# Patient Record
Sex: MALE | Race: WHITE | NOT HISPANIC OR LATINO | URBAN - METROPOLITAN AREA
[De-identification: names, ages, dates, MRNs, and addresses within clinical notes are randomized per-mention and may not be internally consistent; named-entity substitution may affect disease eponyms.]

---

## 2017-01-18 ENCOUNTER — OUTPATIENT (OUTPATIENT)
Dept: OUTPATIENT SERVICES | Facility: HOSPITAL | Age: 57
LOS: 1 days | End: 2017-01-18
Payer: COMMERCIAL

## 2017-01-18 VITALS
TEMPERATURE: 98 F | RESPIRATION RATE: 18 BRPM | OXYGEN SATURATION: 95 % | HEART RATE: 87 BPM | HEIGHT: 78 IN | WEIGHT: 220.02 LBS | DIASTOLIC BLOOD PRESSURE: 82 MMHG | SYSTOLIC BLOOD PRESSURE: 130 MMHG

## 2017-01-18 DIAGNOSIS — T14.8 OTHER INJURY OF UNSPECIFIED BODY REGION: ICD-10-CM

## 2017-01-18 DIAGNOSIS — Z90.89 ACQUIRED ABSENCE OF OTHER ORGANS: Chronic | ICD-10-CM

## 2017-01-18 DIAGNOSIS — S42.351A DISPLACED COMMINUTED FRACTURE OF SHAFT OF HUMERUS, RIGHT ARM, INITIAL ENCOUNTER FOR CLOSED FRACTURE: ICD-10-CM

## 2017-01-18 DIAGNOSIS — Z98.890 OTHER SPECIFIED POSTPROCEDURAL STATES: Chronic | ICD-10-CM

## 2017-01-18 DIAGNOSIS — Z01.818 ENCOUNTER FOR OTHER PREPROCEDURAL EXAMINATION: ICD-10-CM

## 2017-01-18 LAB
BLD GP AB SCN SERPL QL: SIGNIFICANT CHANGE UP
HCT VFR BLD CALC: 44.9 % — SIGNIFICANT CHANGE UP (ref 39–50)
HGB BLD-MCNC: 15.8 G/DL — SIGNIFICANT CHANGE UP (ref 13–17)
MCHC RBC-ENTMCNC: 31.7 PG — SIGNIFICANT CHANGE UP (ref 27–34)
MCHC RBC-ENTMCNC: 35.2 GM/DL — SIGNIFICANT CHANGE UP (ref 32–36)
MCV RBC AUTO: 89.9 FL — SIGNIFICANT CHANGE UP (ref 80–100)
PLATELET # BLD AUTO: 299 K/UL — SIGNIFICANT CHANGE UP (ref 150–400)
RBC # BLD: 5 M/UL — SIGNIFICANT CHANGE UP (ref 4.2–5.8)
RBC # FLD: 11.5 % — SIGNIFICANT CHANGE UP (ref 10.3–14.5)
WBC # BLD: 7.3 K/UL — SIGNIFICANT CHANGE UP (ref 3.8–10.5)
WBC # FLD AUTO: 7.3 K/UL — SIGNIFICANT CHANGE UP (ref 3.8–10.5)

## 2017-01-18 PROCEDURE — 86900 BLOOD TYPING SEROLOGIC ABO: CPT

## 2017-01-18 PROCEDURE — 36415 COLL VENOUS BLD VENIPUNCTURE: CPT

## 2017-01-18 PROCEDURE — 86850 RBC ANTIBODY SCREEN: CPT

## 2017-01-18 PROCEDURE — G0463: CPT

## 2017-01-18 PROCEDURE — 85027 COMPLETE CBC AUTOMATED: CPT

## 2017-01-18 PROCEDURE — 86901 BLOOD TYPING SEROLOGIC RH(D): CPT

## 2017-01-18 NOTE — H&P PST ADULT - NSANTHOSAYNRD_GEN_A_CORE
No. CHEL screening performed.  STOP BANG Legend: 0-2 = LOW Risk; 3-4 = INTERMEDIATE Risk; 5-8 = HIGH Risk

## 2017-01-18 NOTE — H&P PST ADULT - PSH
H/O hernia repair  bilateral inguinal hernia  History of eye surgery  retinal detachment  S/P tonsillectomy

## 2017-01-18 NOTE — H&P PST ADULT - HISTORY OF PRESENT ILLNESS
57 y/o male with h/o fall in house from stairs on 12/26/16 fracturing his right humerus , scheduled for surgery

## 2017-01-19 RX ORDER — SODIUM CHLORIDE 9 MG/ML
1000 INJECTION, SOLUTION INTRAVENOUS
Qty: 0 | Refills: 0 | Status: DISCONTINUED | OUTPATIENT
Start: 2017-01-20 | End: 2017-01-20

## 2017-01-19 NOTE — PATIENT PROFILE ADULT. - VISION (WITH CORRECTIVE LENSES IF THE PATIENT USUALLY WEARS THEM):
Normal vision: sees adequately in most situations; can see medication labels, newsprint Severely impaired: cannot locate objects without hearing or touching them or patient nonresponsive.

## 2017-01-20 ENCOUNTER — INPATIENT (INPATIENT)
Facility: HOSPITAL | Age: 57
LOS: 0 days | Discharge: HOME | DRG: 494 | End: 2017-01-21
Attending: SPECIALIST | Admitting: SPECIALIST
Payer: COMMERCIAL

## 2017-01-20 VITALS
HEIGHT: 78 IN | SYSTOLIC BLOOD PRESSURE: 132 MMHG | DIASTOLIC BLOOD PRESSURE: 87 MMHG | WEIGHT: 216.49 LBS | HEART RATE: 89 BPM | TEMPERATURE: 99 F | OXYGEN SATURATION: 98 % | RESPIRATION RATE: 14 BRPM

## 2017-01-20 DIAGNOSIS — Z98.890 OTHER SPECIFIED POSTPROCEDURAL STATES: Chronic | ICD-10-CM

## 2017-01-20 DIAGNOSIS — S42.351A DISPLACED COMMINUTED FRACTURE OF SHAFT OF HUMERUS, RIGHT ARM, INITIAL ENCOUNTER FOR CLOSED FRACTURE: ICD-10-CM

## 2017-01-20 DIAGNOSIS — Z90.89 ACQUIRED ABSENCE OF OTHER ORGANS: Chronic | ICD-10-CM

## 2017-01-20 PROCEDURE — 99223 1ST HOSP IP/OBS HIGH 75: CPT

## 2017-01-20 RX ORDER — SODIUM CHLORIDE 9 MG/ML
1000 INJECTION, SOLUTION INTRAVENOUS
Qty: 0 | Refills: 0 | Status: DISCONTINUED | OUTPATIENT
Start: 2017-01-20 | End: 2017-01-20

## 2017-01-20 RX ORDER — ASPIRIN/CALCIUM CARB/MAGNESIUM 324 MG
325 TABLET ORAL EVERY 12 HOURS
Qty: 0 | Refills: 0 | Status: DISCONTINUED | OUTPATIENT
Start: 2017-01-21 | End: 2017-01-21

## 2017-01-20 RX ORDER — HYDROMORPHONE HYDROCHLORIDE 2 MG/ML
0.5 INJECTION INTRAMUSCULAR; INTRAVENOUS; SUBCUTANEOUS EVERY 6 HOURS
Qty: 0 | Refills: 0 | Status: DISCONTINUED | OUTPATIENT
Start: 2017-01-20 | End: 2017-01-21

## 2017-01-20 RX ORDER — SODIUM CHLORIDE 9 MG/ML
1000 INJECTION, SOLUTION INTRAVENOUS
Qty: 0 | Refills: 0 | Status: DISCONTINUED | OUTPATIENT
Start: 2017-01-20 | End: 2017-01-21

## 2017-01-20 RX ORDER — CEFAZOLIN SODIUM 1 G
2000 VIAL (EA) INJECTION ONCE
Qty: 0 | Refills: 0 | Status: COMPLETED | OUTPATIENT
Start: 2017-01-20 | End: 2017-01-20

## 2017-01-20 RX ORDER — ESCITALOPRAM OXALATE 10 MG/1
20 TABLET, FILM COATED ORAL DAILY
Qty: 0 | Refills: 0 | Status: DISCONTINUED | OUTPATIENT
Start: 2017-01-20 | End: 2017-01-21

## 2017-01-20 RX ORDER — CEFAZOLIN SODIUM 1 G
2000 VIAL (EA) INJECTION EVERY 8 HOURS
Qty: 0 | Refills: 0 | Status: COMPLETED | OUTPATIENT
Start: 2017-01-21 | End: 2017-01-21

## 2017-01-20 RX ORDER — OXYCODONE HYDROCHLORIDE 5 MG/1
10 TABLET ORAL EVERY 4 HOURS
Qty: 0 | Refills: 0 | Status: DISCONTINUED | OUTPATIENT
Start: 2017-01-20 | End: 2017-01-21

## 2017-01-20 RX ORDER — OXYCODONE HYDROCHLORIDE 5 MG/1
1 TABLET ORAL
Qty: 15 | Refills: 0 | OUTPATIENT
Start: 2017-01-20

## 2017-01-20 RX ORDER — INFLUENZA VIRUS VACCINE 15; 15; 15; 15 UG/.5ML; UG/.5ML; UG/.5ML; UG/.5ML
0.5 SUSPENSION INTRAMUSCULAR ONCE
Qty: 0 | Refills: 0 | Status: COMPLETED | OUTPATIENT
Start: 2017-01-20 | End: 2017-01-21

## 2017-01-20 RX ORDER — OXYCODONE HYDROCHLORIDE 5 MG/1
5 TABLET ORAL EVERY 4 HOURS
Qty: 0 | Refills: 0 | Status: DISCONTINUED | OUTPATIENT
Start: 2017-01-20 | End: 2017-01-21

## 2017-01-20 RX ORDER — OXYCODONE HYDROCHLORIDE 5 MG/1
5 TABLET ORAL ONCE
Qty: 0 | Refills: 0 | Status: DISCONTINUED | OUTPATIENT
Start: 2017-01-20 | End: 2017-01-20

## 2017-01-20 RX ORDER — ASPIRIN/CALCIUM CARB/MAGNESIUM 324 MG
1 TABLET ORAL
Qty: 84 | Refills: 0 | OUTPATIENT
Start: 2017-01-20 | End: 2017-03-03

## 2017-01-20 RX ORDER — ONDANSETRON 8 MG/1
4 TABLET, FILM COATED ORAL EVERY 6 HOURS
Qty: 0 | Refills: 0 | Status: DISCONTINUED | OUTPATIENT
Start: 2017-01-20 | End: 2017-01-21

## 2017-01-20 RX ORDER — LAMOTRIGINE 25 MG/1
200 TABLET, ORALLY DISINTEGRATING ORAL
Qty: 0 | Refills: 0 | Status: DISCONTINUED | OUTPATIENT
Start: 2017-01-20 | End: 2017-01-21

## 2017-01-20 RX ORDER — HYDROMORPHONE HYDROCHLORIDE 2 MG/ML
0.5 INJECTION INTRAMUSCULAR; INTRAVENOUS; SUBCUTANEOUS
Qty: 0 | Refills: 0 | Status: DISCONTINUED | OUTPATIENT
Start: 2017-01-20 | End: 2017-01-20

## 2017-01-20 RX ORDER — ONDANSETRON 8 MG/1
4 TABLET, FILM COATED ORAL ONCE
Qty: 0 | Refills: 0 | Status: DISCONTINUED | OUTPATIENT
Start: 2017-01-20 | End: 2017-01-20

## 2017-01-20 RX ADMIN — OXYCODONE HYDROCHLORIDE 5 MILLIGRAM(S): 5 TABLET ORAL at 21:33

## 2017-01-20 RX ADMIN — OXYCODONE HYDROCHLORIDE 5 MILLIGRAM(S): 5 TABLET ORAL at 22:36

## 2017-01-20 RX ADMIN — OXYCODONE HYDROCHLORIDE 5 MILLIGRAM(S): 5 TABLET ORAL at 22:03

## 2017-01-20 RX ADMIN — OXYCODONE HYDROCHLORIDE 5 MILLIGRAM(S): 5 TABLET ORAL at 23:06

## 2017-01-20 RX ADMIN — SODIUM CHLORIDE 75 MILLILITER(S): 9 INJECTION, SOLUTION INTRAVENOUS at 11:13

## 2017-01-20 NOTE — DISCHARGE NOTE ADULT - PATIENT PORTAL LINK FT
“You can access the Follow[employer] Patient Portal, offered by [facility], by registering with the following website: [email or web address]”

## 2017-01-20 NOTE — DISCHARGE NOTE ADULT - HOSPITAL COURSE
This patient was admitted to [facility] with a displaced fracture of the right humeral shaft.  Patient went to Pre-Surgical Testing at [facility] and was medically cleared to undergo elective procedure. Patient underwent ORIF right humerus by Dr. Kris Miles on 1/20/17. Procedure was well tolerated.  No operative or gregor-operative complications arose during patients hospital course.  Patient received antibiotic according to SCIP guidelines for infection prevention. Ecotrin was given for DVT prophylaxis.  Anesthesia, Medical Hospitalist, Physical Therapy and Occupational Therapy were consulted. Patient is stable for discharge with a good prognosis.  Appropriate discharge instructions and medications are provided in this document. This patient was admitted to [facility] with a displaced fracture of the right humeral shaft.  Patient went to Pre-Surgical Testing at [facility] and was medically cleared to undergo elective procedure. Patient underwent ORIF right humerus by Dr. Kris Miles on 1/20/17. Procedure was well tolerated.  Uncomplicated postoperative course followed.    Patient received antibiotic according to SCIP guidelines for infection prevention. Ecotrin was given for DVT prophylaxis.  Anesthesia, Medical Hospitalist, Physical Therapy and Occupational Therapy were consulted. Patient is stable for discharge with a good prognosis.  Appropriate discharge instructions and medications are provided in this document.

## 2017-01-20 NOTE — DISCHARGE NOTE ADULT - PLAN OF CARE
improve pain and increase function of Right upper extremity Continue splint, non-weightbearing right upper extremity Continue splint, non-weightbearing right upper extremity, Flex and extend the wrist and hand often to reduce swelling and stiffness,  Apply ice packs to the arm to reduce pain and swelling.  Keep the splint/ dressing clean and dry.

## 2017-01-20 NOTE — DISCHARGE NOTE ADULT - CARE PROVIDER_API CALL
Kris Miles), Orthopaedic Surgery  [address] [place] [address]  Phone: [phone]  Fax: [phone]

## 2017-01-20 NOTE — DISCHARGE NOTE ADULT - MEDICATION SUMMARY - MEDICATIONS TO TAKE
I will START or STAY ON the medications listed below when I get home from the hospital:    acetaminophen-oxyCODONE 325 mg-5 mg oral tablet  -- 1 tab(s) by mouth every 6 hours MDD:6 tabs - for moderate pain  -- Caution federal law prohibits the transfer of this drug to any person other  than the person for whom it was prescribed.  May cause drowsiness.  Alcohol may intensify this effect.  Use care when operating dangerous machinery.  This prescription cannot be refilled.  This product contains acetaminophen.  Do not use  with any other product containing acetaminophen to prevent possible liver damage.  Using more of this medication than prescribed may cause serious breathing problems.    -- Indication: For pain    Aspirin Enteric Coated 325 mg oral delayed release tablet  -- 1 tab(s) by mouth every 12 hours MDD:2  -- Swallow whole.  Do not crush.  Take with food or milk.    -- Indication: For prevent DVT clots    aspirin 325 mg oral delayed release tablet  -- 1 tab(s) by mouth every 12 hours  -- Indication: For same as above    LaMICtal  -- 200 milligram(s) by mouth 2 times a day  -- Indication: For seizure control, mood    Lexapro  -- 20  by mouth once a day  -- Indication: For mood

## 2017-01-20 NOTE — DISCHARGE NOTE ADULT - MEDICATION SUMMARY - MEDICATIONS TO STOP TAKING
I will STOP taking the medications listed below when I get home from the hospital:    Advil  --  by mouth , As Needed  -- 800mg

## 2017-01-20 NOTE — PHYSICAL THERAPY INITIAL EVALUATION ADULT - RANGE OF MOTION EXAMINATION, REHAB EVAL
Left UE ROM was WFL (within functional limits)/R shoulder NT due to sx/bilateral lower extremity ROM was WFL (within functional limits)

## 2017-01-20 NOTE — DISCHARGE NOTE ADULT - NS AS ACTIVITY OBS
Do not drive or operate machinery/No Heavy lifting/straining No Heavy lifting/straining/Do not drive or operate machinery/Showering allowed

## 2017-01-20 NOTE — DISCHARGE NOTE ADULT - CARE PLAN
Principal Discharge DX:	Closed displaced transverse fracture of shaft of right humerus, initial encounter  Goal:	improve pain and increase function of Right upper extremity  Instructions for follow-up, activity and diet:	Continue splint, non-weightbearing right upper extremity Principal Discharge DX:	Closed displaced transverse fracture of shaft of right humerus, initial encounter  Goal:	improve pain and increase function of Right upper extremity  Instructions for follow-up, activity and diet:	Continue splint, non-weightbearing right upper extremity, Flex and extend the wrist and hand often to reduce swelling and stiffness,  Apply ice packs to the arm to reduce pain and swelling.  Keep the splint/ dressing clean and dry.

## 2017-01-21 VITALS
DIASTOLIC BLOOD PRESSURE: 71 MMHG | SYSTOLIC BLOOD PRESSURE: 118 MMHG | OXYGEN SATURATION: 94 % | RESPIRATION RATE: 18 BRPM | HEART RATE: 17 BPM | TEMPERATURE: 98 F

## 2017-01-21 LAB
ANION GAP SERPL CALC-SCNC: 6 MMOL/L — SIGNIFICANT CHANGE UP (ref 5–17)
BASOPHILS # BLD AUTO: 0.1 K/UL — SIGNIFICANT CHANGE UP (ref 0–0.2)
BASOPHILS NFR BLD AUTO: 1 % — SIGNIFICANT CHANGE UP (ref 0–2)
BUN SERPL-MCNC: 19 MG/DL — SIGNIFICANT CHANGE UP (ref 7–23)
CALCIUM SERPL-MCNC: 9 MG/DL — SIGNIFICANT CHANGE UP (ref 8.4–10.5)
CHLORIDE SERPL-SCNC: 101 MMOL/L — SIGNIFICANT CHANGE UP (ref 96–108)
CO2 SERPL-SCNC: 28 MMOL/L — SIGNIFICANT CHANGE UP (ref 22–31)
CREAT SERPL-MCNC: 1.23 MG/DL — SIGNIFICANT CHANGE UP (ref 0.5–1.3)
EOSINOPHIL # BLD AUTO: 0.1 K/UL — SIGNIFICANT CHANGE UP (ref 0–0.5)
EOSINOPHIL NFR BLD AUTO: 0.6 % — SIGNIFICANT CHANGE UP (ref 0–6)
GLUCOSE SERPL-MCNC: 126 MG/DL — HIGH (ref 70–99)
HCT VFR BLD CALC: 34.4 % — LOW (ref 39–50)
HGB BLD-MCNC: 12.6 G/DL — LOW (ref 13–17)
LYMPHOCYTES # BLD AUTO: 2.3 K/UL — SIGNIFICANT CHANGE UP (ref 1–3.3)
LYMPHOCYTES # BLD AUTO: 21.2 % — SIGNIFICANT CHANGE UP (ref 13–44)
MCHC RBC-ENTMCNC: 32.6 PG — SIGNIFICANT CHANGE UP (ref 27–34)
MCHC RBC-ENTMCNC: 36.7 GM/DL — HIGH (ref 32–36)
MCV RBC AUTO: 88.7 FL — SIGNIFICANT CHANGE UP (ref 80–100)
MONOCYTES # BLD AUTO: 1 K/UL — HIGH (ref 0–0.9)
MONOCYTES NFR BLD AUTO: 9.2 % — SIGNIFICANT CHANGE UP (ref 2–14)
NEUTROPHILS # BLD AUTO: 7.5 K/UL — HIGH (ref 1.8–7.4)
NEUTROPHILS NFR BLD AUTO: 68 % — SIGNIFICANT CHANGE UP (ref 43–77)
PLATELET # BLD AUTO: 230 K/UL — SIGNIFICANT CHANGE UP (ref 150–400)
POTASSIUM SERPL-MCNC: 3.9 MMOL/L — SIGNIFICANT CHANGE UP (ref 3.5–5.3)
POTASSIUM SERPL-SCNC: 3.9 MMOL/L — SIGNIFICANT CHANGE UP (ref 3.5–5.3)
RBC # BLD: 3.87 M/UL — LOW (ref 4.2–5.8)
RBC # FLD: 11.5 % — SIGNIFICANT CHANGE UP (ref 10.3–14.5)
SODIUM SERPL-SCNC: 135 MMOL/L — SIGNIFICANT CHANGE UP (ref 135–145)
WBC # BLD: 11 K/UL — HIGH (ref 3.8–10.5)
WBC # FLD AUTO: 11 K/UL — HIGH (ref 3.8–10.5)

## 2017-01-21 PROCEDURE — 94664 DEMO&/EVAL PT USE INHALER: CPT

## 2017-01-21 PROCEDURE — 85027 COMPLETE CBC AUTOMATED: CPT

## 2017-01-21 PROCEDURE — 80048 BASIC METABOLIC PNL TOTAL CA: CPT

## 2017-01-21 PROCEDURE — 97530 THERAPEUTIC ACTIVITIES: CPT

## 2017-01-21 PROCEDURE — 76000 FLUOROSCOPY <1 HR PHYS/QHP: CPT

## 2017-01-21 PROCEDURE — C1713: CPT

## 2017-01-21 PROCEDURE — 97001: CPT

## 2017-01-21 PROCEDURE — 97161 PT EVAL LOW COMPLEX 20 MIN: CPT

## 2017-01-21 PROCEDURE — 97116 GAIT TRAINING THERAPY: CPT

## 2017-01-21 PROCEDURE — 99233 SBSQ HOSP IP/OBS HIGH 50: CPT

## 2017-01-21 PROCEDURE — 97165 OT EVAL LOW COMPLEX 30 MIN: CPT

## 2017-01-21 PROCEDURE — 97003: CPT

## 2017-01-21 PROCEDURE — 99239 HOSP IP/OBS DSCHRG MGMT >30: CPT

## 2017-01-21 PROCEDURE — 90686 IIV4 VACC NO PRSV 0.5 ML IM: CPT

## 2017-01-21 PROCEDURE — C1889: CPT

## 2017-01-21 RX ORDER — ASPIRIN/CALCIUM CARB/MAGNESIUM 324 MG
1 TABLET ORAL
Qty: 0 | Refills: 0 | COMMUNITY
Start: 2017-01-21

## 2017-01-21 RX ORDER — IBUPROFEN 200 MG
0 TABLET ORAL
Qty: 0 | Refills: 0 | COMMUNITY

## 2017-01-21 RX ADMIN — Medication 100 MILLIGRAM(S): at 00:10

## 2017-01-21 RX ADMIN — OXYCODONE HYDROCHLORIDE 10 MILLIGRAM(S): 5 TABLET ORAL at 14:30

## 2017-01-21 RX ADMIN — Medication 325 MILLIGRAM(S): at 09:15

## 2017-01-21 RX ADMIN — OXYCODONE HYDROCHLORIDE 10 MILLIGRAM(S): 5 TABLET ORAL at 10:02

## 2017-01-21 RX ADMIN — OXYCODONE HYDROCHLORIDE 10 MILLIGRAM(S): 5 TABLET ORAL at 13:48

## 2017-01-21 RX ADMIN — Medication 100 MILLIGRAM(S): at 07:58

## 2017-01-21 RX ADMIN — OXYCODONE HYDROCHLORIDE 10 MILLIGRAM(S): 5 TABLET ORAL at 10:35

## 2017-01-21 RX ADMIN — LAMOTRIGINE 200 MILLIGRAM(S): 25 TABLET, ORALLY DISINTEGRATING ORAL at 05:24

## 2017-01-21 RX ADMIN — INFLUENZA VIRUS VACCINE 0.5 MILLILITER(S): 15; 15; 15; 15 SUSPENSION INTRAMUSCULAR at 10:11

## 2017-01-21 RX ADMIN — OXYCODONE HYDROCHLORIDE 10 MILLIGRAM(S): 5 TABLET ORAL at 06:45

## 2017-01-21 RX ADMIN — OXYCODONE HYDROCHLORIDE 10 MILLIGRAM(S): 5 TABLET ORAL at 06:15

## 2017-04-25 PROBLEM — F31.70 BIPOLAR DISORDER, CURRENTLY IN REMISSION, MOST RECENT EPISODE UNSPECIFIED: Chronic | Status: ACTIVE | Noted: 2017-01-18

## 2017-04-26 ENCOUNTER — OUTPATIENT (OUTPATIENT)
Dept: OUTPATIENT SERVICES | Facility: HOSPITAL | Age: 57
LOS: 1 days | End: 2017-04-26
Payer: COMMERCIAL

## 2017-04-26 VITALS
WEIGHT: 214.95 LBS | HEART RATE: 78 BPM | RESPIRATION RATE: 16 BRPM | DIASTOLIC BLOOD PRESSURE: 76 MMHG | TEMPERATURE: 98 F | SYSTOLIC BLOOD PRESSURE: 118 MMHG | HEIGHT: 78 IN | OXYGEN SATURATION: 98 %

## 2017-04-26 DIAGNOSIS — Z01.818 ENCOUNTER FOR OTHER PREPROCEDURAL EXAMINATION: ICD-10-CM

## 2017-04-26 DIAGNOSIS — T14.8 OTHER INJURY OF UNSPECIFIED BODY REGION: ICD-10-CM

## 2017-04-26 DIAGNOSIS — Z96.7 PRESENCE OF OTHER BONE AND TENDON IMPLANTS: Chronic | ICD-10-CM

## 2017-04-26 DIAGNOSIS — S42.321A DISPLACED TRANSVERSE FRACTURE OF SHAFT OF HUMERUS, RIGHT ARM, INITIAL ENCOUNTER FOR CLOSED FRACTURE: ICD-10-CM

## 2017-04-26 DIAGNOSIS — Z98.890 OTHER SPECIFIED POSTPROCEDURAL STATES: Chronic | ICD-10-CM

## 2017-04-26 DIAGNOSIS — Z90.89 ACQUIRED ABSENCE OF OTHER ORGANS: Chronic | ICD-10-CM

## 2017-04-26 LAB
BLD GP AB SCN SERPL QL: SIGNIFICANT CHANGE UP
HCT VFR BLD CALC: 45.6 % — SIGNIFICANT CHANGE UP (ref 39–50)
HGB BLD-MCNC: 15.3 G/DL — SIGNIFICANT CHANGE UP (ref 13–17)
MCHC RBC-ENTMCNC: 30.5 PG — SIGNIFICANT CHANGE UP (ref 27–34)
MCHC RBC-ENTMCNC: 33.6 GM/DL — SIGNIFICANT CHANGE UP (ref 32–36)
MCV RBC AUTO: 90.7 FL — SIGNIFICANT CHANGE UP (ref 80–100)
PLATELET # BLD AUTO: 228 K/UL — SIGNIFICANT CHANGE UP (ref 150–400)
RBC # BLD: 5.03 M/UL — SIGNIFICANT CHANGE UP (ref 4.2–5.8)
RBC # FLD: 11.2 % — SIGNIFICANT CHANGE UP (ref 10.3–14.5)
WBC # BLD: 6.6 K/UL — SIGNIFICANT CHANGE UP (ref 3.8–10.5)
WBC # FLD AUTO: 6.6 K/UL — SIGNIFICANT CHANGE UP (ref 3.8–10.5)

## 2017-04-26 PROCEDURE — 85027 COMPLETE CBC AUTOMATED: CPT

## 2017-04-26 PROCEDURE — G0463: CPT

## 2017-04-26 PROCEDURE — 86900 BLOOD TYPING SEROLOGIC ABO: CPT

## 2017-04-26 PROCEDURE — 93010 ELECTROCARDIOGRAM REPORT: CPT | Mod: NC

## 2017-04-26 PROCEDURE — 86850 RBC ANTIBODY SCREEN: CPT

## 2017-04-26 PROCEDURE — 36415 COLL VENOUS BLD VENIPUNCTURE: CPT

## 2017-04-26 PROCEDURE — 93005 ELECTROCARDIOGRAM TRACING: CPT

## 2017-04-26 PROCEDURE — 86901 BLOOD TYPING SEROLOGIC RH(D): CPT

## 2017-04-26 NOTE — H&P PST ADULT - PSH
H/O hernia repair  bilateral inguinal hernia 1970  History of eye surgery  retinal detachment,1998  S/P ORIF (open reduction internal fixation) fracture  right humerus 1/20/17  S/P tonsillectomy

## 2017-04-26 NOTE — H&P PST ADULT - PMH
Bipolar disorder, currently in remission    Depression    Fracture  right humerus fracture, 1/2017, s/p ORIF  CHEL (obstructive sleep apnea)  MILD< no assisstive devices

## 2017-04-26 NOTE — H&P PST ADULT - HISTORY OF PRESENT ILLNESS
55 y/o male with h/o fall in house from stairs on 12/26/16 fracturing his right humerus had ORIF done january20,2017. Patient still in pain as the couple of pins are  not intact and is rescheduled for ORIF. Denies any injury or trauma after ORIF. Pain with certain ROM and takes advil as needed with some relief

## 2017-04-26 NOTE — H&P PST ADULT - PROBLEM SELECTOR PLAN 1
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

## 2017-04-27 RX ORDER — FAMOTIDINE 10 MG/ML
1 INJECTION INTRAVENOUS
Qty: 0 | Refills: 0 | COMMUNITY
Start: 2017-04-27 | End: 2017-04-28

## 2017-04-27 NOTE — ASU PATIENT PROFILE, ADULT - PSH
H/O hernia repair  bilateral inguinal hernia 1970  History of eye surgery  retinal detachment,1998 left  Buckle procedure  S/P ORIF (open reduction internal fixation) fracture  right humerus 1/20/17  S/P tonsillectomy

## 2017-04-28 ENCOUNTER — INPATIENT (INPATIENT)
Facility: HOSPITAL | Age: 57
LOS: 0 days | Discharge: HOME | DRG: 494 | End: 2017-04-29
Attending: SPECIALIST | Admitting: SPECIALIST
Payer: COMMERCIAL

## 2017-04-28 VITALS
OXYGEN SATURATION: 99 % | DIASTOLIC BLOOD PRESSURE: 78 MMHG | HEIGHT: 78 IN | HEART RATE: 68 BPM | TEMPERATURE: 99 F | SYSTOLIC BLOOD PRESSURE: 128 MMHG | RESPIRATION RATE: 16 BRPM | WEIGHT: 218.26 LBS

## 2017-04-28 DIAGNOSIS — Z01.818 ENCOUNTER FOR OTHER PREPROCEDURAL EXAMINATION: ICD-10-CM
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
## 2017-04-28 DIAGNOSIS — G47.33 OBSTRUCTIVE SLEEP APNEA (ADULT) (PEDIATRIC): ICD-10-CM

## 2017-04-28 DIAGNOSIS — Z72.89 OTHER PROBLEMS RELATED TO LIFESTYLE: ICD-10-CM

## 2017-04-28 DIAGNOSIS — F31.9 BIPOLAR DISORDER, UNSPECIFIED: ICD-10-CM

## 2017-04-28 DIAGNOSIS — S42.321A DISPLACED TRANSVERSE FRACTURE OF SHAFT OF HUMERUS, RIGHT ARM, INITIAL ENCOUNTER FOR CLOSED FRACTURE: ICD-10-CM

## 2017-04-28 DIAGNOSIS — S42.351A DISPLACED COMMINUTED FRACTURE OF SHAFT OF HUMERUS, RIGHT ARM, INITIAL ENCOUNTER FOR CLOSED FRACTURE: ICD-10-CM

## 2017-04-28 DIAGNOSIS — Z98.890 OTHER SPECIFIED POSTPROCEDURAL STATES: Chronic | ICD-10-CM

## 2017-04-28 DIAGNOSIS — Z90.89 ACQUIRED ABSENCE OF OTHER ORGANS: Chronic | ICD-10-CM

## 2017-04-28 DIAGNOSIS — Z96.7 PRESENCE OF OTHER BONE AND TENDON IMPLANTS: Chronic | ICD-10-CM

## 2017-04-28 PROCEDURE — 73060 X-RAY EXAM OF HUMERUS: CPT | Mod: 26,RT

## 2017-04-28 PROCEDURE — 88300 SURGICAL PATH GROSS: CPT | Mod: 26

## 2017-04-28 RX ORDER — ESCITALOPRAM OXALATE 10 MG/1
20 TABLET, FILM COATED ORAL
Qty: 0 | Refills: 0 | COMMUNITY

## 2017-04-28 RX ORDER — SODIUM CHLORIDE 9 MG/ML
1000 INJECTION, SOLUTION INTRAVENOUS
Qty: 0 | Refills: 0 | Status: DISCONTINUED | OUTPATIENT
Start: 2017-04-28 | End: 2017-04-28

## 2017-04-28 RX ORDER — CEFAZOLIN SODIUM 1 G
2000 VIAL (EA) INJECTION ONCE
Qty: 0 | Refills: 0 | Status: COMPLETED | OUTPATIENT
Start: 2017-04-28 | End: 2017-04-28

## 2017-04-28 RX ORDER — LAMOTRIGINE 25 MG/1
200 TABLET, ORALLY DISINTEGRATING ORAL
Qty: 0 | Refills: 0 | Status: DISCONTINUED | OUTPATIENT
Start: 2017-04-28 | End: 2017-04-29

## 2017-04-28 RX ORDER — ACETAMINOPHEN 500 MG
1000 TABLET ORAL EVERY 6 HOURS
Qty: 0 | Refills: 0 | Status: COMPLETED | OUTPATIENT
Start: 2017-04-28 | End: 2017-04-29

## 2017-04-28 RX ORDER — HYDROMORPHONE HYDROCHLORIDE 2 MG/ML
0.5 INJECTION INTRAMUSCULAR; INTRAVENOUS; SUBCUTANEOUS
Qty: 0 | Refills: 0 | Status: DISCONTINUED | OUTPATIENT
Start: 2017-04-28 | End: 2017-04-28

## 2017-04-28 RX ORDER — DOCUSATE SODIUM 100 MG
100 CAPSULE ORAL THREE TIMES A DAY
Qty: 0 | Refills: 0 | Status: DISCONTINUED | OUTPATIENT
Start: 2017-04-28 | End: 2017-04-29

## 2017-04-28 RX ORDER — OXYCODONE HYDROCHLORIDE 5 MG/1
5 TABLET ORAL
Qty: 0 | Refills: 0 | Status: DISCONTINUED | OUTPATIENT
Start: 2017-04-28 | End: 2017-04-29

## 2017-04-28 RX ORDER — ONDANSETRON 8 MG/1
4 TABLET, FILM COATED ORAL ONCE
Qty: 0 | Refills: 0 | Status: DISCONTINUED | OUTPATIENT
Start: 2017-04-28 | End: 2017-04-28

## 2017-04-28 RX ORDER — ACETAMINOPHEN 500 MG
1000 TABLET ORAL EVERY 8 HOURS
Qty: 0 | Refills: 0 | Status: DISCONTINUED | OUTPATIENT
Start: 2017-04-29 | End: 2017-04-29

## 2017-04-28 RX ORDER — OXYCODONE HYDROCHLORIDE 5 MG/1
10 TABLET ORAL
Qty: 0 | Refills: 0 | Status: DISCONTINUED | OUTPATIENT
Start: 2017-04-28 | End: 2017-04-29

## 2017-04-28 RX ORDER — CEFAZOLIN SODIUM 1 G
2000 VIAL (EA) INJECTION EVERY 8 HOURS
Qty: 0 | Refills: 0 | Status: COMPLETED | OUTPATIENT
Start: 2017-04-28 | End: 2017-04-28

## 2017-04-28 RX ORDER — LAMOTRIGINE 25 MG/1
200 TABLET, ORALLY DISINTEGRATING ORAL
Qty: 0 | Refills: 0 | COMMUNITY

## 2017-04-28 RX ORDER — SODIUM CHLORIDE 9 MG/ML
1000 INJECTION, SOLUTION INTRAVENOUS
Qty: 0 | Refills: 0 | Status: DISCONTINUED | OUTPATIENT
Start: 2017-04-28 | End: 2017-04-29

## 2017-04-28 RX ORDER — HYDROMORPHONE HYDROCHLORIDE 2 MG/ML
0.5 INJECTION INTRAMUSCULAR; INTRAVENOUS; SUBCUTANEOUS
Qty: 0 | Refills: 0 | Status: DISCONTINUED | OUTPATIENT
Start: 2017-04-28 | End: 2017-04-29

## 2017-04-28 RX ORDER — ESCITALOPRAM OXALATE 10 MG/1
20 TABLET, FILM COATED ORAL DAILY
Qty: 0 | Refills: 0 | Status: DISCONTINUED | OUTPATIENT
Start: 2017-04-28 | End: 2017-04-29

## 2017-04-28 RX ADMIN — Medication 100 MILLIGRAM(S): at 23:47

## 2017-04-28 RX ADMIN — LAMOTRIGINE 200 MILLIGRAM(S): 25 TABLET, ORALLY DISINTEGRATING ORAL at 18:17

## 2017-04-28 RX ADMIN — Medication 400 MILLIGRAM(S): at 18:17

## 2017-04-28 RX ADMIN — Medication 100 MILLIGRAM(S): at 16:30

## 2017-04-28 RX ADMIN — OXYCODONE HYDROCHLORIDE 10 MILLIGRAM(S): 5 TABLET ORAL at 20:37

## 2017-04-28 RX ADMIN — Medication 100 MILLIGRAM(S): at 20:37

## 2017-04-28 RX ADMIN — OXYCODONE HYDROCHLORIDE 10 MILLIGRAM(S): 5 TABLET ORAL at 23:45

## 2017-04-28 RX ADMIN — OXYCODONE HYDROCHLORIDE 10 MILLIGRAM(S): 5 TABLET ORAL at 21:12

## 2017-04-28 RX ADMIN — Medication 400 MILLIGRAM(S): at 23:13

## 2017-04-28 RX ADMIN — Medication 1000 MILLIGRAM(S): at 23:14

## 2017-04-28 RX ADMIN — OXYCODONE HYDROCHLORIDE 10 MILLIGRAM(S): 5 TABLET ORAL at 23:13

## 2017-04-28 RX ADMIN — SODIUM CHLORIDE 75 MILLILITER(S): 9 INJECTION, SOLUTION INTRAVENOUS at 16:30

## 2017-04-28 RX ADMIN — HYDROMORPHONE HYDROCHLORIDE 0.5 MILLIGRAM(S): 2 INJECTION INTRAMUSCULAR; INTRAVENOUS; SUBCUTANEOUS at 21:30

## 2017-04-28 RX ADMIN — HYDROMORPHONE HYDROCHLORIDE 0.5 MILLIGRAM(S): 2 INJECTION INTRAMUSCULAR; INTRAVENOUS; SUBCUTANEOUS at 21:12

## 2017-04-28 NOTE — BRIEF OPERATIVE NOTE - POST-OP DX
Closed displaced transverse fracture of shaft of right humerus  04/28/2017  non-union  Active  Everardo Givens

## 2017-04-28 NOTE — PROGRESS NOTE ADULT - SUBJECTIVE AND OBJECTIVE BOX
Ortho PA - Post Op Check - S/P revision ORIF right humerus with interscalene nerve block and general anesthesia          Pt alert and comfortable with no complaints, pain controlled.  Patient states pain is 2-3 out of 10 near antecubital fossa of right elbow only.  Denies nausea/ate dinner.     Vital Signs Last 24 Hrs  T(C): 36.9, Max: 36.9 (04-28 @ 15:54)  T(F): 98.4, Max: 98.4 (04-28 @ 15:54)  HR: 85 (79 - 92)  BP: 107/66 (94/53 - 107/66)  BP(mean): --  RR: 18 (14 - 25)  SpO2: 99% (97% - 99%)  Wt(kg): --  I&O's Detail    I & Os for current day (as of 28 Apr 2017 19:44)  =============================================  IN:    lactated ringers.: 300 ml    Total IN: 300 ml  ---------------------------------------------  OUT:    Total OUT: 0 ml  ---------------------------------------------  Total NET: 300 ml    I&O's Summary    I & Os for current day (as of 28 Apr 2017 19:44)  =============================================  IN: 300 ml / OUT: 0 ml / NET: 300 ml                   MEDICATIONS:lactated ringers. 1000milliLiter(s) IV Continuous <Continuous>  docusate sodium 100milliGRAM(s) Oral three times a day  ceFAZolin   IVPB 2000milliGRAM(s) IV Intermittent every 8 hours  oxyCODONE IR 5milliGRAM(s) Oral every 3 hours PRN  oxyCODONE IR 10milliGRAM(s) Oral every 3 hours PRN  HYDROmorphone  Injectable 0.5milliGRAM(s) IV Push every 3 hours PRN  lamoTRIgine 200milliGRAM(s) Oral two times a day  escitalopram 20milliGRAM(s) Oral daily  acetaminophen  IVPB. 1000milliGRAM(s) IV Intermittent every 6 hours    Anticoagulation: PAS to both legs      Antibiotics:   ceFAZolin   IVPB 2000milliGRAM(s) IV Intermittent every 8 hours x 2 doses postop      Pain medications:   oxyCODONE IR 5milliGRAM(s) Oral every 3 hours PRN mild pain  oxyCODONE IR 10milliGRAM(s) Oral every 3 hours PRN moderate pain  HYDROmorphone  Injectable 0.5milliGRAM(s) IV Push every 3 hours PRN severe pain  lamoTRIgine 200milliGRAM(s) Oral two times a day  escitalopram 20milliGRAM(s) Oral daily  acetaminophen  IVPB. 1000milliGRAM(s) IV Intermittent every 6 hours      PE:  Right UE-sling readjusted and primary surgical bandage noted dry and intact. NV intact UE; +dorsiflexion of wrist noted as well as full motion and sensation of all fingers  Feet mobile and sensate.  PAS on LE's.  Calves soft and nontender.  Large bag of ice placed on elbow area.    A/P: Ortho stable post-op  - Continue post-op orders; pain management with prn narcotics and Tylenol  - DVT prevention with PAS to legs.   - PT /OT for OOB, full WBAT legs, but NWB Right UE in sling.  -Discharge planning fro home tomorrow if pain well-controlled with oral meds and ambulating safely.  -Will continue to monitor closely with attending. Ortho PA - Post Op Check - S/P revision ORIF right humerus with interscalene nerve block and general anesthesia          Pt alert and comfortable with no complaints, pain controlled.  Patient states pain is 2-3 out of 10 near antecubital fossa of right elbow only.  Denies nausea/ate dinner.     Vital Signs Last 24 Hrs  T(C): 36.9, Max: 36.9 (04-28 @ 15:54)  T(F): 98.4, Max: 98.4 (04-28 @ 15:54)  HR: 85 (79 - 92)  BP: 107/66 (94/53 - 107/66)  BP(mean): --  RR: 18 (14 - 25)  SpO2: 99% (97% - 99%)  Wt(kg): --  I&O's Detail    I & Os for current day (as of 28 Apr 2017 19:44)  =============================================  IN:    lactated ringers.: 300 ml    Total IN: 300 ml  ---------------------------------------------  OUT:    Total OUT: 0 ml  ---------------------------------------------  Total NET: 300 ml    I&O's Summary    I & Os for current day (as of 28 Apr 2017 19:44)  =============================================  IN: 300 ml / OUT: 0 ml / NET: 300 ml                   MEDICATIONS:lactated ringers. 1000milliLiter(s) IV Continuous <Continuous>  docusate sodium 100milliGRAM(s) Oral three times a day  ceFAZolin   IVPB 2000milliGRAM(s) IV Intermittent every 8 hours  oxyCODONE IR 5milliGRAM(s) Oral every 3 hours PRN  oxyCODONE IR 10milliGRAM(s) Oral every 3 hours PRN  HYDROmorphone  Injectable 0.5milliGRAM(s) IV Push every 3 hours PRN  lamoTRIgine 200milliGRAM(s) Oral two times a day  escitalopram 20milliGRAM(s) Oral daily  acetaminophen  IVPB. 1000milliGRAM(s) IV Intermittent every 6 hours    Anticoagulation: PAS to both legs      Antibiotics:   ceFAZolin   IVPB 2000milliGRAM(s) IV Intermittent every 8 hours x 2 doses postop      Pain medications:   oxyCODONE IR 5milliGRAM(s) Oral every 3 hours PRN mild pain  oxyCODONE IR 10milliGRAM(s) Oral every 3 hours PRN moderate pain  HYDROmorphone  Injectable 0.5milliGRAM(s) IV Push every 3 hours PRN severe pain  lamoTRIgine 200milliGRAM(s) Oral two times a day  escitalopram 20milliGRAM(s) Oral daily  acetaminophen  IVPB. 1000milliGRAM(s) IV Intermittent every 6 hours      PE:  Right UE-sling readjusted and primary surgical bandage noted dry and intact. NV intact UE; +dorsiflexion of wrist noted as well as full motion and sensation of all fingers  Feet mobile and sensate.  PAS on LE's.  Calves soft and nontender.  Large bag of ice placed on elbow area.    A/P: Ortho stable post-op  - Continue post-op orders; pain management with prn narcotics and Tylenol.  -Check CBC and BMP in AM.  - DVT prevention with PAS to legs.   - PT /OT for OOB, full WBAT legs, but NWB Right UE in sling.  -Discharge planning fro home tomorrow if pain well-controlled with oral meds and ambulating safely.  -Will continue to monitor closely with attending.

## 2017-04-28 NOTE — PHYSICAL THERAPY INITIAL EVALUATION ADULT - ADDITIONAL COMMENTS
Pt lives with family in a house w/ no steps to enter and 1 flight of steps to bedroom.   Pt had first ORIF of right shoulder in January 2017

## 2017-04-28 NOTE — PHYSICAL THERAPY INITIAL EVALUATION ADULT - RANGE OF MOTION EXAMINATION, REHAB EVAL
Left UE ROM was WFL (within functional limits)/RUE NT due to surgery/bilateral lower extremity ROM was WFL (within functional limits)

## 2017-04-29 VITALS
OXYGEN SATURATION: 97 % | RESPIRATION RATE: 16 BRPM | DIASTOLIC BLOOD PRESSURE: 55 MMHG | SYSTOLIC BLOOD PRESSURE: 93 MMHG | TEMPERATURE: 98 F | HEART RATE: 71 BPM

## 2017-04-29 LAB
ANION GAP SERPL CALC-SCNC: 6 MMOL/L — SIGNIFICANT CHANGE UP (ref 5–17)
BUN SERPL-MCNC: 23 MG/DL — SIGNIFICANT CHANGE UP (ref 7–23)
CALCIUM SERPL-MCNC: 8.7 MG/DL — SIGNIFICANT CHANGE UP (ref 8.4–10.5)
CHLORIDE SERPL-SCNC: 104 MMOL/L — SIGNIFICANT CHANGE UP (ref 96–108)
CO2 SERPL-SCNC: 31 MMOL/L — SIGNIFICANT CHANGE UP (ref 22–31)
CREAT SERPL-MCNC: 0.93 MG/DL — SIGNIFICANT CHANGE UP (ref 0.5–1.3)
GLUCOSE SERPL-MCNC: 112 MG/DL — HIGH (ref 70–99)
HCT VFR BLD CALC: 35.9 % — LOW (ref 39–50)
HGB BLD-MCNC: 12.2 G/DL — LOW (ref 13–17)
MCHC RBC-ENTMCNC: 31.6 PG — SIGNIFICANT CHANGE UP (ref 27–34)
MCHC RBC-ENTMCNC: 34.1 GM/DL — SIGNIFICANT CHANGE UP (ref 32–36)
MCV RBC AUTO: 92.9 FL — SIGNIFICANT CHANGE UP (ref 80–100)
PLATELET # BLD AUTO: 182 K/UL — SIGNIFICANT CHANGE UP (ref 150–400)
POTASSIUM SERPL-MCNC: 4.3 MMOL/L — SIGNIFICANT CHANGE UP (ref 3.5–5.3)
POTASSIUM SERPL-SCNC: 4.3 MMOL/L — SIGNIFICANT CHANGE UP (ref 3.5–5.3)
RBC # BLD: 3.87 M/UL — LOW (ref 4.2–5.8)
RBC # FLD: 11.4 % — SIGNIFICANT CHANGE UP (ref 10.3–14.5)
SODIUM SERPL-SCNC: 141 MMOL/L — SIGNIFICANT CHANGE UP (ref 135–145)
WBC # BLD: 9.7 K/UL — SIGNIFICANT CHANGE UP (ref 3.8–10.5)
WBC # FLD AUTO: 9.7 K/UL — SIGNIFICANT CHANGE UP (ref 3.8–10.5)

## 2017-04-29 PROCEDURE — 97161 PT EVAL LOW COMPLEX 20 MIN: CPT

## 2017-04-29 PROCEDURE — 76000 FLUOROSCOPY <1 HR PHYS/QHP: CPT

## 2017-04-29 PROCEDURE — 80048 BASIC METABOLIC PNL TOTAL CA: CPT

## 2017-04-29 PROCEDURE — 97165 OT EVAL LOW COMPLEX 30 MIN: CPT

## 2017-04-29 PROCEDURE — 88300 SURGICAL PATH GROSS: CPT

## 2017-04-29 PROCEDURE — C1889: CPT

## 2017-04-29 PROCEDURE — C1713: CPT

## 2017-04-29 PROCEDURE — 73060 X-RAY EXAM OF HUMERUS: CPT

## 2017-04-29 PROCEDURE — 85027 COMPLETE CBC AUTOMATED: CPT

## 2017-04-29 RX ORDER — OXYCODONE HYDROCHLORIDE 5 MG/1
1 TABLET ORAL
Qty: 60 | Refills: 0 | OUTPATIENT
Start: 2017-04-29 | End: 2017-05-09

## 2017-04-29 RX ORDER — ACETAMINOPHEN 500 MG
2 TABLET ORAL
Qty: 0 | Refills: 0 | COMMUNITY
Start: 2017-04-29 | End: 2017-05-12

## 2017-04-29 RX ORDER — DOCUSATE SODIUM 100 MG
1 CAPSULE ORAL
Qty: 0 | Refills: 0 | COMMUNITY
Start: 2017-04-29

## 2017-04-29 RX ADMIN — OXYCODONE HYDROCHLORIDE 10 MILLIGRAM(S): 5 TABLET ORAL at 09:30

## 2017-04-29 RX ADMIN — Medication 100 MILLIGRAM(S): at 05:21

## 2017-04-29 RX ADMIN — HYDROMORPHONE HYDROCHLORIDE 0.5 MILLIGRAM(S): 2 INJECTION INTRAMUSCULAR; INTRAVENOUS; SUBCUTANEOUS at 01:45

## 2017-04-29 RX ADMIN — HYDROMORPHONE HYDROCHLORIDE 0.5 MILLIGRAM(S): 2 INJECTION INTRAMUSCULAR; INTRAVENOUS; SUBCUTANEOUS at 01:21

## 2017-04-29 RX ADMIN — Medication 1000 MILLIGRAM(S): at 11:50

## 2017-04-29 RX ADMIN — Medication 1000 MILLIGRAM(S): at 05:27

## 2017-04-29 RX ADMIN — OXYCODONE HYDROCHLORIDE 10 MILLIGRAM(S): 5 TABLET ORAL at 08:53

## 2017-04-29 RX ADMIN — Medication 400 MILLIGRAM(S): at 05:20

## 2017-04-29 RX ADMIN — OXYCODONE HYDROCHLORIDE 10 MILLIGRAM(S): 5 TABLET ORAL at 11:50

## 2017-04-29 RX ADMIN — OXYCODONE HYDROCHLORIDE 10 MILLIGRAM(S): 5 TABLET ORAL at 12:45

## 2017-04-29 RX ADMIN — HYDROMORPHONE HYDROCHLORIDE 0.5 MILLIGRAM(S): 2 INJECTION INTRAMUSCULAR; INTRAVENOUS; SUBCUTANEOUS at 05:21

## 2017-04-29 RX ADMIN — LAMOTRIGINE 200 MILLIGRAM(S): 25 TABLET, ORALLY DISINTEGRATING ORAL at 05:21

## 2017-04-29 RX ADMIN — ESCITALOPRAM OXALATE 20 MILLIGRAM(S): 10 TABLET, FILM COATED ORAL at 11:50

## 2017-04-29 RX ADMIN — HYDROMORPHONE HYDROCHLORIDE 0.5 MILLIGRAM(S): 2 INJECTION INTRAMUSCULAR; INTRAVENOUS; SUBCUTANEOUS at 05:45

## 2017-04-29 NOTE — DISCHARGE NOTE ADULT - NS AS ACTIVITY OBS
No Heavy lifting/straining/Do not drive or operate machinery/Walking-Outdoors allowed/Walking-Indoors allowed

## 2017-04-29 NOTE — DISCHARGE NOTE ADULT - MEDICATION SUMMARY - MEDICATIONS TO STOP TAKING
I will STOP taking the medications listed below when I get home from the hospital:    Pepcid 20 mg oral tablet  -- 1 tab(s) by mouth twice

## 2017-04-29 NOTE — DISCHARGE NOTE ADULT - PATIENT PORTAL LINK FT
“You can access the FollowHealth Patient Portal, offered by [facility], by registering with the following website: [email or web address]”

## 2017-04-29 NOTE — DISCHARGE NOTE ADULT - MEDICATION SUMMARY - MEDICATIONS TO TAKE
I will START or STAY ON the medications listed below when I get home from the hospital:    acetaminophen 500 mg oral tablet  -- 2 tab(s) by mouth every 8 hours  -- Indication: For pain    oxyCODONE 20 mg oral tablet  -- 1 tab(s) by mouth every 4 hours MDD:6  -- Caution federal law prohibits the transfer of this drug to any person other  than the person for whom it was prescribed.  Check with your doctor before becoming pregnant.  Do not drink alcoholic beverages when taking this medication.  May cause drowsiness.  Alcohol may intensify this effect.  Use care when operating dangerous machinery.  This drug may impair the ability to drive or operate machinery.  Use care until you become familiar with its effects.  This prescription cannot be refilled.  Using more of this medication than prescribed may cause serious breathing problems.    -- Indication: For pain    LaMICtal  -- 200 milligram(s) by mouth 2 times a day  -- Indication: For Closed displaced transverse fracture of shaft of right humerus    Lexapro  -- 20  by mouth once a day  -- Indication: For depression    docusate sodium 100 mg oral capsule  -- 1 cap(s) by mouth 3 times a day  -- Indication: For stool softener

## 2017-04-29 NOTE — DISCHARGE NOTE ADULT - HOSPITAL COURSE
This patient was admitted to [facility] with a history malunion right humerus fracture s/p ORIF 1/20/17. Patient underwent pre-surgical Testing at [facility] and was medically cleared to undergo procedure. Patient underwent ORIF right humerus by Dr. Kris Miles on 4/28/17.  Procedure was well tolerated.  No operative or gregor-operative complications arose during patients hospital course.  Patient received antibiotic according to SCIP guidelines for infection prevention.  SCDs were used for DVT prophylaxis.  Anesthesia, Medical Hospitalist, Physical Therapy and Occupational Therapy were consulted. Patient is stable for discharge with a good prognosis.  Appropriate discharge instructions and medications are provided in this document.

## 2017-04-29 NOTE — DISCHARGE NOTE ADULT - CARE PROVIDER_API CALL
Kris Miles), Orthopaedic Surgery  [address] [place] [address]  Phone: [phone]  Fax: [phone]

## 2017-04-29 NOTE — DISCHARGE NOTE ADULT - CARE PLAN
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  • Temperature greater than 101° F.  • Redness, increased swelling or foul smelling drainage from or around the  incision.  • Numbness, tingling or a change in color or temperature of the operative leg.  • Call your doctor immediately if you experience chest pain, shortness of breath or calf pain.

## 2017-04-29 NOTE — DISCHARGE NOTE ADULT - INSTRUCTIONS
none  For Constipation :   • Increase your water intake. Drink at least 8 glasses of water daily.  • Try adding fiber to your diet by eating fruits, vegetables and foods that are rich in grains.  • If you do experience constipation, you may take an over-the-counter stool softener/laxative such as Brittany Colace, [place] or  Milk of Magnesia. right humerus

## 2017-04-29 NOTE — PROGRESS NOTE ADULT - SUBJECTIVE AND OBJECTIVE BOX
POST OPERATIVE DAY #: 1  STATUS POST:   ORIF Right humerus                    SUBJECTIVE: Patient seen and examined. Patient with expected post-op discomfort. Oxycodone 10mg doesn't help.     Pain (0-10): 7    OBJECTIVE:     Vital Signs Last 24 Hrs  T(C): 36.8, Max: 37 (04-28 @ 12:24)  T(F): 98.2, Max: 98.6 (04-28 @ 12:24)  HR: 58 (58 - 92)  BP: 80/50 (80/50 - 121/88)  RR: 16 (11 - 25)  SpO2: 96% (96% - 99%)    Right upper extremity:          Dressing:  clean/dry/intact          Sensation:  intact to light touch          Motor exam: 5- /5 wrist flexion, extension, thumb extension, ; 5/5 finger adduction   warm well perfused; capillary refill <3 seconds; 2+ radial pulse    LABS:                        12.2   9.7   )-----------( 182      ( 29 Apr 2017 07:13 )             35.9     04-29    141  |  104  |  23  ----------------------------<  112<H>  4.3   |  31  |  0.93    Ca    8.7      29 Apr 2017 07:15            MEDICATIONS:  Anticoagulation:      Pain medications:   oxyCODONE IR 5milliGRAM(s) Oral every 3 hours PRN  oxyCODONE IR 10milliGRAM(s) Oral every 3 hours PRN  HYDROmorphone  Injectable 0.5milliGRAM(s) IV Push every 3 hours PRN  lamoTRIgine 200milliGRAM(s) Oral two times a day  escitalopram 20milliGRAM(s) Oral daily  acetaminophen   Tablet 1000milliGRAM(s) Oral every 8 hours        A/P : Patient stable s/p ORIF Right humerus  docusate sodium 100milliGRAM(s) Oral three times a day  oxyCODONE IR 5milliGRAM(s) Oral every 3 hours PRN  oxyCODONE IR 10milliGRAM(s) Oral every 3 hours PRN  acetaminophen   Tablet 1000milliGRAM(s) Oral every 8 hours    s/p Right Humerus ORIF  POD # 1  -    Pain control - will increase oxycodone dose  -    Physical Therapy  -    Discharge planning: home today

## 2017-04-29 NOTE — DISCHARGE NOTE ADULT - PLAN OF CARE
decrease pain, restore function, improve ADLs and quality of life sling, keep incision clean and dry, non-weight-bearing right upper extremity  - Call your doctor if you experience:  • An increase in pain not controlled by pain medication or change in activity or  position.  • Temperature greater than 101° F.  • Redness, increased swelling or foul smelling drainage from or around the  incision.  • Numbness, tingling or a change in color or temperature of the operative leg.  • Call your doctor immediately if you experience chest pain, shortness of breath or calf pain.

## 2018-07-16 PROBLEM — T14.8 OTHER INJURY OF UNSPECIFIED BODY REGION: Chronic | Status: ACTIVE | Noted: 2017-04-26

## 2021-11-29 NOTE — PRE-OP CHECKLIST - HAIR REMOVAL
yes Cimzia Pregnancy And Lactation Text: This medication crosses the placenta but can be considered safe in certain situations. Cimzia may be excreted in breast milk. hair removal not indicated

## 2023-12-15 NOTE — OCCUPATIONAL THERAPY INITIAL EVALUATION ADULT - LEVEL OF CONSCIOUSNESS, OT EVAL
[de-identified] : 1/15/2016: normal study  [de-identified] : 9/30/2021: normal LV systolic function, grade I diastolic dsyfunction \par  1/2016: LVEF 55-60%, grade I diastolic dysfunction \par  5/2016: LV function 55%, grade I diastolic dysfunction  alert

## 2024-06-20 NOTE — ASU PATIENT PROFILE, ADULT - SURGERY TIME
Incoming call received from patient. Notified of follow-up appointment that had been scheduled on 9/20/24 with Dr. Butler at 1420 at [address]. Patient agreeable to this appointment time and location.   08:30

## 2024-09-06 NOTE — DISCHARGE NOTE ADULT - CAREGIVER ADDRESS
Addended by: SHANTE EASTMAN on: 9/6/2024 04:57 PM     Modules accepted: Orders    
[address] [place]

## 2025-05-20 NOTE — H&P PST ADULT - NS NEC GEN PE MLT EXAM PC
detailed exam